# Patient Record
Sex: FEMALE | Race: WHITE | NOT HISPANIC OR LATINO | URBAN - METROPOLITAN AREA
[De-identification: names, ages, dates, MRNs, and addresses within clinical notes are randomized per-mention and may not be internally consistent; named-entity substitution may affect disease eponyms.]

---

## 2024-10-02 ENCOUNTER — OFFICE VISIT (OUTPATIENT)
Dept: URBAN - METROPOLITAN AREA CLINIC 12 | Facility: CLINIC | Age: 39
End: 2024-10-02
Payer: COMMERCIAL

## 2024-10-02 ENCOUNTER — DASHBOARD ENCOUNTERS (OUTPATIENT)
Age: 39
End: 2024-10-02

## 2024-10-02 VITALS
SYSTOLIC BLOOD PRESSURE: 114 MMHG | DIASTOLIC BLOOD PRESSURE: 84 MMHG | BODY MASS INDEX: 29.22 KG/M2 | WEIGHT: 175.4 LBS | HEIGHT: 65 IN | TEMPERATURE: 99 F | HEART RATE: 68 BPM

## 2024-10-02 DIAGNOSIS — K62.5 RECTAL BLEEDING: ICD-10-CM

## 2024-10-02 PROCEDURE — 99203 OFFICE O/P NEW LOW 30 MIN: CPT

## 2024-10-02 NOTE — HPI-TODAY'S VISIT:
This patient was referred by Dr. Katya Buck  for an evaluation of rectal bleeding. A copy of this will be sent to the referring provider.  Patient is a 39 yo female presents today for rectal bleeding. he bleeding has been occurring for more than 6-7 years but has increased in frequency over the last 2-3 months, now occurring 4-5 times a week. The bleeding is bright red and visible on the toilet bowl and toilet paper. There is no apparent pattern or relation to food intake. No rectal pain is reported, does not believe she has hemorrhoids. S/p 3 vaginal births. She has regular daily bowel movements daily without constipation or straining, no abd pain. Weight and appetite have been stable. Denies family history of colorectal cancer, no prior colonoscopy. Denies problems with heart, lungs or kidneys. No hx of anemia. No trouble with anesthesia in the past.

## 2024-10-30 ENCOUNTER — OFFICE VISIT (OUTPATIENT)
Dept: URBAN - METROPOLITAN AREA SURGERY CENTER 15 | Facility: SURGERY CENTER | Age: 39
End: 2024-10-30

## 2024-10-30 ENCOUNTER — TELEPHONE ENCOUNTER (OUTPATIENT)
Dept: URBAN - METROPOLITAN AREA CLINIC 78 | Facility: CLINIC | Age: 39
End: 2024-10-30

## 2024-10-30 RX ORDER — SODIUM PICOSULFATE, MAGNESIUM OXIDE, AND ANHYDROUS CITRIC ACID 12; 3.5; 1 G/175ML; G/175ML; MG/175ML
175 ML THE FIRST DOSE THE EVENING BEFORE AND SECOND DOSE THE MORNING OF COLONOSCOPY LIQUID ORAL
Qty: 1 UNSPECIFIED | Refills: 0 | OUTPATIENT
Start: 2024-10-30 | End: 2024-10-31

## 2024-11-05 ENCOUNTER — CLAIMS CREATED FROM THE CLAIM WINDOW (OUTPATIENT)
Dept: URBAN - METROPOLITAN AREA SURGERY CENTER 15 | Facility: SURGERY CENTER | Age: 39
End: 2024-11-05
Payer: COMMERCIAL

## 2024-11-05 DIAGNOSIS — K62.5 RECTAL BLEEDING: ICD-10-CM

## 2024-11-05 DIAGNOSIS — K62.5 HEMORRHAGE OF ANUS AND RECTUM: ICD-10-CM

## 2024-11-05 PROCEDURE — 00811 ANES LWR INTST NDSC NOS: CPT | Performed by: REGISTERED NURSE

## 2024-11-05 PROCEDURE — 45378 DIAGNOSTIC COLONOSCOPY: CPT | Performed by: STUDENT IN AN ORGANIZED HEALTH CARE EDUCATION/TRAINING PROGRAM

## 2024-11-13 ENCOUNTER — OFFICE VISIT (OUTPATIENT)
Dept: URBAN - METROPOLITAN AREA CLINIC 12 | Facility: CLINIC | Age: 39
End: 2024-11-13

## 2024-11-18 ENCOUNTER — OFFICE VISIT (OUTPATIENT)
Dept: URBAN - METROPOLITAN AREA CLINIC 12 | Facility: CLINIC | Age: 39
End: 2024-11-18

## 2024-11-19 ENCOUNTER — OFFICE VISIT (OUTPATIENT)
Dept: URBAN - METROPOLITAN AREA CLINIC 12 | Facility: CLINIC | Age: 39
End: 2024-11-19
Payer: COMMERCIAL

## 2024-11-19 VITALS
SYSTOLIC BLOOD PRESSURE: 120 MMHG | WEIGHT: 175 LBS | HEART RATE: 79 BPM | HEIGHT: 65 IN | TEMPERATURE: 97.7 F | DIASTOLIC BLOOD PRESSURE: 79 MMHG | BODY MASS INDEX: 29.16 KG/M2

## 2024-11-19 DIAGNOSIS — K62.5 RECTAL BLEEDING: ICD-10-CM

## 2024-11-19 PROBLEM — 12063002: Status: ACTIVE | Noted: 2024-11-19

## 2024-11-19 PROCEDURE — 99213 OFFICE O/P EST LOW 20 MIN: CPT

## 2024-11-19 NOTE — HPI-TODAY'S VISIT:
Pt is a 38 yo female presents today for a f/u after a recent colonosocpy for chronic rectal bleeding. S/p colon with Dr. Lewis on 11/5/24 with poor bowel prep- w/o endoscopic evidence of mass in the entire colon. She was advised to repeat colon at age 46. She continues to have intermittent rectal bleeding. Denies constipation, straining, rectal pain or abd pain.  ____________________________ Last OV note from 10/2/24 Patient is a 39 yo female presents today for rectal bleeding. he bleeding has been occurring for more than 6-7 years but has increased in frequency over the last 2-3 months, now occurring 4-5 times a week. The bleeding is bright red and visible on the toilet bowl and toilet paper. There is no apparent pattern or relation to food intake. No rectal pain is reported, does not believe she has hemorrhoids. S/p 3 vaginal births. She has regular daily bowel movements daily without constipation or straining, no abd pain. Weight and appetite have been stable. Denies family history of colorectal cancer, no prior colonoscopy. Denies problems with heart, lungs or kidneys. No hx of anemia. No trouble with anesthesia in the past.